# Patient Record
Sex: FEMALE | Race: WHITE | NOT HISPANIC OR LATINO | Employment: FULL TIME | URBAN - METROPOLITAN AREA
[De-identification: names, ages, dates, MRNs, and addresses within clinical notes are randomized per-mention and may not be internally consistent; named-entity substitution may affect disease eponyms.]

---

## 2020-01-28 ENCOUNTER — TELEPHONE (OUTPATIENT)
Dept: UROLOGY | Facility: CLINIC | Age: 45
End: 2020-01-28

## 2020-02-10 ENCOUNTER — OFFICE VISIT (OUTPATIENT)
Dept: UROLOGY | Facility: CLINIC | Age: 45
End: 2020-02-10
Payer: COMMERCIAL

## 2020-02-10 VITALS — DIASTOLIC BLOOD PRESSURE: 75 MMHG | SYSTOLIC BLOOD PRESSURE: 111 MMHG | WEIGHT: 123.69 LBS | HEART RATE: 74 BPM

## 2020-02-10 DIAGNOSIS — R31.0 GROSS HEMATURIA: ICD-10-CM

## 2020-02-10 DIAGNOSIS — R30.0 DYSURIA: Primary | ICD-10-CM

## 2020-02-10 PROCEDURE — 99999 PR PBB SHADOW E&M-EST. PATIENT-LVL II: ICD-10-PCS | Mod: PBBFAC,,, | Performed by: UROLOGY

## 2020-02-10 PROCEDURE — 99999 PR PBB SHADOW E&M-EST. PATIENT-LVL II: CPT | Mod: PBBFAC,,, | Performed by: UROLOGY

## 2020-02-10 PROCEDURE — 99204 PR OFFICE/OUTPT VISIT, NEW, LEVL IV, 45-59 MIN: ICD-10-PCS | Mod: S$GLB,,, | Performed by: UROLOGY

## 2020-02-10 PROCEDURE — 99204 OFFICE O/P NEW MOD 45 MIN: CPT | Mod: S$GLB,,, | Performed by: UROLOGY

## 2020-02-10 RX ORDER — CYANOCOBALAMIN (VITAMIN B-12) 1000 MCG
TABLET, EXTENDED RELEASE ORAL
COMMUNITY

## 2020-02-10 RX ORDER — AMOXICILLIN 500 MG
CAPSULE ORAL DAILY
COMMUNITY

## 2020-02-10 RX ORDER — CALCIUM CARBONATE 600 MG
1200 TABLET ORAL ONCE
COMMUNITY

## 2020-02-10 RX ORDER — ACETAMINOPHEN 160 MG/5ML
200 SUSPENSION, ORAL (FINAL DOSE FORM) ORAL DAILY
COMMUNITY

## 2020-02-10 RX ORDER — MAGNESIUM 250 MG
TABLET ORAL ONCE
COMMUNITY

## 2020-02-10 RX ORDER — IBUPROFEN 100 MG/5ML
2000 SUSPENSION, ORAL (FINAL DOSE FORM) ORAL DAILY
COMMUNITY

## 2020-02-10 RX ORDER — LEVOTHYROXINE SODIUM 100 UG/1
100 TABLET ORAL
COMMUNITY

## 2020-02-10 NOTE — LETTER
February 10, 2020      Parisa Pruett MD  [address]   [address]  [facility] [address]           [facility] - Urology 4th Floor  [address]  [facility] [address]  Phone: [phone]          Patient: Van Michael   MR Number: [id number]   YOB: 1975   Date of Visit: 2/10/2020       Dear :    Thank you for referring Van Michael to me for evaluation. Attached you will find relevant portions of my assessment and plan of care.    If you have questions, please do not hesitate to call me. I look forward to following Van Michael along with you.    Sincerely,    Jackson Burgos MD    Enclosure  CC:  No Recipients    If you would like to receive this communication electronically, please contact [email or web address] or [phone] to request more information on [employer] access.    For providers and/or their staff who would like to refer a patient to [facility], please contact us through our one-stop-shop provider referral line, [facility], at [phone].    If you feel you have received this communication in error or would no longer like to receive these types of communications, please e-mail [email or web address]

## 2020-02-10 NOTE — PROGRESS NOTES
[facility]      Hematuria and Dysuria New Note    2/10/2020    Referred by:  Parisa Pruett MD    HPI: Van Michael is a very pleasant 44 y.o. female who is a new patient to our department referred for evaluation of history of gross hematuria. She also reports occasional dysuria, though she is not experiencing currently.  She reports symptoms of irritative voiding including dysuria. She reports no frequency, urgency or incontinence.  She denies symptoms of obstructive voiding including decreased stream, hesitancy, intermittency, post void dribbling and sense of incomplete emptying. Bladder scan PVR was 0 mL.  Her history includes prior pelvic surgery () but excludes a history of urolithiasis, neurological symptoms/diagnoses, incontinence procedures and prolapse surgeries. She reports no urinary incontinence.     Recent urine culture was reviewed and was negative. Her urinalysis today is suggestive of microscopic hematuria.     She reports no risk factors including tobacco exposure, family history of urologic malignancy, or industrial exposures.      Prior Evaluations:   No previous upper urinary tract evaluation   No previous lower urinary tract evaluation    A review of 10+ systems was conducted with pertinent positive and negative findings documented in HPI with all other systems reviewed and negative.    Past medical, family, surgical and social history reviewed as documented in chart with pertinent positive medical, family, surgical and social history detailed in HPI.    Exam Findings:    Const: no acute distress, conversant and alert  Eyes: anicteric, extraocular muscles intact  ENMT: normocephalic, Nl oral membranes  Cardio: no cyanosis, nl cap refill  Pulm: no tachypnea; no resp distress  Abd: soft, no tenderness  Musc: no laceration, no tenderness  Neuro: alert; oriented x 3  Skin: warm, dry; no petichiae  Psych: no anxiety; normal speech       Assessment/Plan:    Gross Hematuria:  (new,  addt'l workup):  We discussed evaluation of her hematuria, including upper and lower urinary tract studies.     1. Scheduled CT Urogram for evaluation of upper urinary tracts.  2. Scheduled office cystourethroscopy with urine cytology and FISH at that time if indicated

## 2020-02-14 ENCOUNTER — HOSPITAL ENCOUNTER (OUTPATIENT)
Dept: RADIOLOGY | Facility: HOSPITAL | Age: 45
Discharge: HOME | End: 2020-02-14
Attending: UROLOGY
Payer: COMMERCIAL

## 2020-02-14 DIAGNOSIS — R31.0 GROSS HEMATURIA: ICD-10-CM

## 2020-02-14 PROCEDURE — 25500020 PHARM REV CODE 255: Performed by: UROLOGY

## 2020-02-14 PROCEDURE — 74178 CT ABD&PLV WO CNTR FLWD CNTR: CPT | Mod: TC

## 2020-02-14 PROCEDURE — 74178 CT UROGRAM ABD PELVIS W WO: ICD-10-PCS | Mod: 26,,, | Performed by: RADIOLOGY

## 2020-02-14 PROCEDURE — 74178 CT ABD&PLV WO CNTR FLWD CNTR: CPT | Mod: 26,,, | Performed by: RADIOLOGY

## 2020-02-14 RX ADMIN — IOHEXOL 125 ML: 350 INJECTION, SOLUTION INTRAVENOUS at 08:02

## 2020-02-20 ENCOUNTER — HOSPITAL ENCOUNTER (OUTPATIENT)
Dept: UROLOGY | Facility: HOSPITAL | Age: 45
Discharge: HOME | End: 2020-02-20
Attending: UROLOGY
Payer: COMMERCIAL

## 2020-02-20 ENCOUNTER — TELEPHONE (OUTPATIENT)
Dept: UROLOGY | Facility: CLINIC | Age: 45
End: 2020-02-20

## 2020-02-20 VITALS
RESPIRATION RATE: 17 BRPM | SYSTOLIC BLOOD PRESSURE: 145 MMHG | TEMPERATURE: 98 F | HEART RATE: 70 BPM | DIASTOLIC BLOOD PRESSURE: 67 MMHG | WEIGHT: 120.38 LBS | BODY MASS INDEX: 22.15 KG/M2 | HEIGHT: 62 IN

## 2020-02-20 DIAGNOSIS — R31.0 GROSS HEMATURIA: Primary | ICD-10-CM

## 2020-02-20 PROCEDURE — 52000 CYSTOURETHROSCOPY: CPT | Mod: ,,, | Performed by: UROLOGY

## 2020-02-20 PROCEDURE — 52000 CYSTOURETHROSCOPY: CPT

## 2020-02-20 PROCEDURE — 52000 PR CYSTOURETHROSCOPY: ICD-10-PCS | Mod: ,,, | Performed by: UROLOGY

## 2020-02-20 RX ORDER — LIDOCAINE HYDROCHLORIDE 20 MG/ML
JELLY TOPICAL
Status: COMPLETED | OUTPATIENT
Start: 2020-02-20 | End: 2020-02-20

## 2020-02-20 RX ADMIN — LIDOCAINE HYDROCHLORIDE 10 ML: 20 JELLY TOPICAL at 02:02

## 2020-02-20 NOTE — PROCEDURES
Office Cystourethroscopy Note    Date: 2/20/2020   Referring Provider: Jackson Burgos MD     Reason for Cystoscopy: Gross Hematuria    Upper Tract Evaluation:    CT Urogram: Normal upper urinary tract with exception of likely small renal cysts    Procedure Details: Informed consent was obtained and she was sterily prepped and 1% lidocaine jelly was injected per urethra. A flexible cystoscope was inserted into the bladder via the urethra. There was no evidence of stricture, stenosis, lesions, other obstruction or diverticulum. Significant findings included a normal unobstructed urethra only. Cystoscopic examination of the bladder revealed orthotopically positioned, normal bilateral ureteral orifices with clear yellow urine effluxing from each orifice. All mucosal surfaces were examined with no apparent stones, tumors, foreign bodies, erythema, trabeculation, diverticula or ulcers. The procedure was concluded without complications. The patient was not administered a post-procedure antibiotic.     Specimens: No Specimens    Findings: Normal bladder and urethra    Other Findings:  PVR - 0 mL    Pelvic Exam:  No Pelvic Exam Repeated Today     Assesment and Plan:   Gross Hematuria: No obvious concerning etiology identified on cystoscopy today or upper urinary tract imaging. If gross hematuria persists, I would consider cystoscopy at the time of gross hematuria episode to attempt to identify source of bleeding or if bleeding lateralizes to either ureter or possible bilateral ureteroscopy.

## 2020-02-20 NOTE — PATIENT INSTRUCTIONS
What to Expect After a Cystoscopy  For the next 24-48 hours, you may feel a mild burning when you urinate. This burning is normal and expected. Drink plenty of water to dilute the urine to help relieve the burning sensation. You may also see a small amount of blood in your urine after the procedure.    Unless you are already taking antibiotics, you may be given an antibiotic after the test to prevent infection.    Signs and Symptoms to Report  Call the [facility] at [phone] if you develop any of the following:  · Fever of 101 degrees or higher  · Chills or persistent bleeding  · Inability to urinate

## 2020-02-20 NOTE — TELEPHONE ENCOUNTER
----- Message from Dagmar Gillette RN sent at 2/20/2020  2:18 PM CST -----  rtc in 6 months for f/u

## 2020-02-21 ENCOUNTER — TELEPHONE (OUTPATIENT)
Dept: UROLOGY | Facility: CLINIC | Age: 45
End: 2020-02-21

## 2020-02-21 NOTE — TELEPHONE ENCOUNTER
----- Message from Dagmar Gillette RN sent at 2/20/2020  2:33 PM CST -----  Regarding: Records to be sent to PCP  Patient asked to have her records sent to her PCP, Dr. Karl Pruett in [place]

## 2020-10-19 ENCOUNTER — OFFICE VISIT (OUTPATIENT)
Dept: OBSTETRICS AND GYNECOLOGY | Facility: CLINIC | Age: 45
End: 2020-10-19
Payer: COMMERCIAL

## 2020-10-19 VITALS
BODY MASS INDEX: 22.82 KG/M2 | WEIGHT: 124 LBS | HEIGHT: 62 IN | DIASTOLIC BLOOD PRESSURE: 86 MMHG | SYSTOLIC BLOOD PRESSURE: 137 MMHG | HEART RATE: 73 BPM

## 2020-10-19 DIAGNOSIS — L72.0 MILIA: Primary | ICD-10-CM

## 2020-10-19 PROCEDURE — 99213 OFFICE O/P EST LOW 20 MIN: CPT | Mod: S$GLB,,, | Performed by: OBSTETRICS & GYNECOLOGY

## 2020-10-19 PROCEDURE — 3008F BODY MASS INDEX DOCD: CPT | Mod: CPTII,S$GLB,, | Performed by: OBSTETRICS & GYNECOLOGY

## 2020-10-19 PROCEDURE — 99999 PR PBB SHADOW E&M-EST. PATIENT-LVL III: ICD-10-PCS | Mod: PBBFAC,,, | Performed by: OBSTETRICS & GYNECOLOGY

## 2020-10-19 PROCEDURE — 3008F PR BODY MASS INDEX (BMI) DOCUMENTED: ICD-10-PCS | Mod: CPTII,S$GLB,, | Performed by: OBSTETRICS & GYNECOLOGY

## 2020-10-19 PROCEDURE — 99213 PR OFFICE/OUTPT VISIT, EST, LEVL III, 20-29 MIN: ICD-10-PCS | Mod: S$GLB,,, | Performed by: OBSTETRICS & GYNECOLOGY

## 2020-10-19 PROCEDURE — 99999 PR PBB SHADOW E&M-EST. PATIENT-LVL III: CPT | Mod: PBBFAC,,, | Performed by: OBSTETRICS & GYNECOLOGY

## 2020-10-19 RX ORDER — PANTOPRAZOLE SODIUM 40 MG/1
TABLET, DELAYED RELEASE ORAL
COMMUNITY
Start: 2020-10-01

## 2020-10-19 RX ORDER — UBROGEPANT 50 MG/1
TABLET ORAL
COMMUNITY
Start: 2020-07-20

## 2020-10-19 NOTE — PATIENT INSTRUCTIONS
Understanding Epidermoid Cyst    An epidermoid cyst is a small abnormal growth in the top layers of the skin. It is filled with keratin, the same proteins that make up your hair and nails. These cysts grow slowly. They can grow anywhere on the body. But they are most often found on the face, behind the ears, and on the chest or upper back.  How to say it  [id number]   What causes an epidermoid cyst?  An epidermoid cyst may occur because of an injury to the skin or from acne.  Symptoms of an epidermoid cyst  An epidermoid cyst is a subcutaneous bump. This means it is just below the skin. It may be yellow or skin-colored. It often has a small black yoselin in the middle of it, like a blackhead.  An epidermoid cyst rarely causes pain, unless it ruptures or becomes infected. It may ooze keratin, a white, cheesy, smelly material. If the cyst becomes inflamed or infected, it may be:  · Bad smelling  · Red  · Swollen  · Tender  Treatment for an epidermoid cyst  Many epidermoid cysts dont cause any problems. They dont necessarily need to be treated. They may go away on their own. But you may want to treat it if you dont like how it looks or it becomes inflamed.  Treatment options include:  · Steroid injection. A steroid may be injected into the cyst. This may help ease inflammation. It may also prevent infection.  · Surgical removal. The cyst can be cut out. It may also need to be drained first. There is a slight chance the cyst may come back.  · Antibiotics. In some cases, you may need to take an oral antibiotic to prevent infection and regrowth.  When to call your healthcare provider  Call your healthcare provider right away if you have any of these:  · Fever of 100.4°F (38°C) or higher, or as directed  · Redness, swelling, or fluid leaking from your incision that gets worse  · Pain that gets worse  · Symptoms that dont get better, or get worse  · New symptoms   Date Last Reviewed: 5/1/2016  © 1512-4279 [employer]. [address] [place] [address], [place] [address]. All rights reserved. This information is not intended as a substitute for professional medical care. Always follow your healthcare professional's instructions.

## 2020-10-19 NOTE — PROGRESS NOTES
Subjective:    Patient ID: Van Michael is a 44 y.o. y.o. female    Chief Complaint:   Chief Complaint   Patient presents with    Vaginal Pain     knot on labia       History of Present Illness:  Van presents today for evaluation of a lump on the labia.  Patient has been known to have several little tiny bumps on bilateral labia minora.  States she has a larger 1 toward the bottom of this area on the right that is more swollen and she noticed it last week.      Review of Systems   Constitutional: Negative for chills, fatigue and fever.   Respiratory: Negative for shortness of breath.    Cardiovascular: Negative for chest pain.   Gastrointestinal: Negative for abdominal pain, constipation, diarrhea and nausea.   Genitourinary: Negative for bladder incontinence, dysuria, hot flashes, pelvic pain and vaginal bleeding.   Neurological: Negative for headaches.   Psychiatric/Behavioral: Negative for depression.         Objective:    Vital Signs:  Vitals:    10/19/20 1341   BP: 137/86   Pulse: 73       Physical Exam:  General:  alert, no distress   Skin:  Skin color, texture, turgor normal. No rashes or lesions       Respiratory:  clear to auscultation bilaterally   Heart:  regular rate and rhythm, S1, S2 normal, no murmur, click, rub or gallop   Abdomen:  Soft, non-tender. Bowel sounds normal. No masses,  no organomegaly   Pelvis: External genitalia: normal general appearance, Small pinpoint areas on the bilateral labia minora consistent with milia/epidermoid cysts.  Each 1 approximately 1 mm  Vaginal: normal mucosa without prolapse or lesions         Discussed the findings and that these are not cancerous and not infectious.  Discussed supportive care in their management.        Assessment:      1. Milia          Plan:      [facility]    supportive care--aquaphor prn  rtc for annual      Earline Brower MD FACOG    10/19/2020  2:07 PM

## 2022-05-10 NOTE — TELEPHONE ENCOUNTER
----- Message from Joan Springer sent at 1/28/2020  8:23 AM CST -----  Regarding: External referral  Good Morning,    I received an external referral on the following patient requesting appointment with Dr. Burgos.   Dx: burning/pain when urinating and mild blood in UA.  Referral and records from Dr. Pruett have been saved into .    Can you please review and contact patient to schedule appointment?    Thank you,  [facility]  Jasmyne Gutierrez   Performed Resulted

## 2022-08-22 ENCOUNTER — LAB VISIT (OUTPATIENT)
Dept: LAB | Facility: HOSPITAL | Age: 47
End: 2022-08-22
Attending: INTERNAL MEDICINE
Payer: COMMERCIAL

## 2022-08-22 DIAGNOSIS — E83.51 HYPOCALCEMIA: Primary | ICD-10-CM

## 2022-08-22 LAB
ANION GAP SERPL CALC-SCNC: 3 MMOL/L (ref 8–16)
BUN SERPL-MCNC: 25 MG/DL (ref 6–20)
CALCIUM SERPL-MCNC: 7.3 MG/DL (ref 8.7–10.5)
CHLORIDE SERPL-SCNC: 107 MMOL/L (ref 95–110)
CO2 SERPL-SCNC: 29 MMOL/L (ref 23–29)
CREAT SERPL-MCNC: 0.7 MG/DL (ref 0.5–1.4)
EST. GFR  (NO RACE VARIABLE): >60 ML/MIN/1.73 M^2
GLUCOSE SERPL-MCNC: 95 MG/DL (ref 70–110)
POTASSIUM SERPL-SCNC: 4 MMOL/L (ref 3.5–5.1)
SODIUM SERPL-SCNC: 139 MMOL/L (ref 136–145)

## 2022-08-22 PROCEDURE — 36415 COLL VENOUS BLD VENIPUNCTURE: CPT | Performed by: INTERNAL MEDICINE

## 2022-08-22 PROCEDURE — 80048 BASIC METABOLIC PNL TOTAL CA: CPT | Performed by: INTERNAL MEDICINE

## 2022-09-23 ENCOUNTER — LAB VISIT (OUTPATIENT)
Dept: LAB | Facility: HOSPITAL | Age: 47
End: 2022-09-23
Attending: INTERNAL MEDICINE
Payer: COMMERCIAL

## 2022-09-23 DIAGNOSIS — E89.0 POSTSURGICAL HYPOTHYROIDISM: Primary | ICD-10-CM

## 2022-09-23 DIAGNOSIS — E83.51 HYPOCALCEMIA: ICD-10-CM

## 2022-09-23 LAB
ALBUMIN SERPL BCP-MCNC: 3.6 G/DL (ref 3.5–5.2)
ANION GAP SERPL CALC-SCNC: 4 MMOL/L (ref 8–16)
BUN SERPL-MCNC: 21 MG/DL (ref 6–20)
CALCIUM SERPL-MCNC: 8.2 MG/DL (ref 8.7–10.5)
CHLORIDE SERPL-SCNC: 105 MMOL/L (ref 95–110)
CO2 SERPL-SCNC: 29 MMOL/L (ref 23–29)
CREAT SERPL-MCNC: 0.7 MG/DL (ref 0.5–1.4)
EST. GFR  (NO RACE VARIABLE): >60 ML/MIN/1.73 M^2
GLUCOSE SERPL-MCNC: 105 MG/DL (ref 70–110)
PHOSPHATE SERPL-MCNC: 3.6 MG/DL (ref 2.7–4.5)
POTASSIUM SERPL-SCNC: 3.9 MMOL/L (ref 3.5–5.1)
SODIUM SERPL-SCNC: 138 MMOL/L (ref 136–145)

## 2022-09-23 PROCEDURE — 36415 COLL VENOUS BLD VENIPUNCTURE: CPT | Performed by: INTERNAL MEDICINE

## 2022-09-23 PROCEDURE — 80069 RENAL FUNCTION PANEL: CPT | Performed by: INTERNAL MEDICINE

## 2024-04-22 ENCOUNTER — LAB VISIT (OUTPATIENT)
Dept: LAB | Facility: HOSPITAL | Age: 49
End: 2024-04-22
Attending: INTERNAL MEDICINE
Payer: COMMERCIAL

## 2024-04-22 DIAGNOSIS — E89.0 POSTSURGICAL HYPOTHYROIDISM: ICD-10-CM

## 2024-04-22 DIAGNOSIS — E83.51 HYPOCALCEMIA: ICD-10-CM

## 2024-04-22 DIAGNOSIS — E89.2 POSTPROCEDURAL HYPOPARATHYROIDISM: Primary | ICD-10-CM

## 2024-04-22 LAB
25(OH)D3+25(OH)D2 SERPL-MCNC: 69 NG/ML (ref 30–96)
ALBUMIN SERPL BCP-MCNC: 4.3 G/DL (ref 3.5–5.2)
ALBUMIN SERPL BCP-MCNC: 4.3 G/DL (ref 3.5–5.2)
ALP SERPL-CCNC: 39 U/L (ref 55–135)
ALT SERPL W/O P-5'-P-CCNC: 33 U/L (ref 10–44)
ANION GAP SERPL CALC-SCNC: 7 MMOL/L (ref 3–11)
ANION GAP SERPL CALC-SCNC: 7 MMOL/L (ref 3–11)
AST SERPL-CCNC: 22 U/L (ref 10–40)
BILIRUB SERPL-MCNC: 0.4 MG/DL (ref 0.1–1)
BUN SERPL-MCNC: 24 MG/DL (ref 6–20)
BUN SERPL-MCNC: 24 MG/DL (ref 6–20)
CALCIUM SERPL-MCNC: 8.8 MG/DL (ref 8.7–10.5)
CALCIUM SERPL-MCNC: 8.8 MG/DL (ref 8.7–10.5)
CHLORIDE SERPL-SCNC: 105 MMOL/L (ref 95–110)
CHLORIDE SERPL-SCNC: 105 MMOL/L (ref 95–110)
CO2 SERPL-SCNC: 27 MMOL/L (ref 23–29)
CO2 SERPL-SCNC: 27 MMOL/L (ref 23–29)
CREAT SERPL-MCNC: 0.7 MG/DL (ref 0.5–1.4)
CREAT SERPL-MCNC: 0.7 MG/DL (ref 0.5–1.4)
EST. GFR  (NO RACE VARIABLE): >60 ML/MIN/1.73 M^2
EST. GFR  (NO RACE VARIABLE): >60 ML/MIN/1.73 M^2
GLUCOSE SERPL-MCNC: 96 MG/DL (ref 70–110)
GLUCOSE SERPL-MCNC: 96 MG/DL (ref 70–110)
PHOSPHATE SERPL-MCNC: 3.5 MG/DL (ref 2.7–4.5)
POTASSIUM SERPL-SCNC: 3.8 MMOL/L (ref 3.5–5.1)
POTASSIUM SERPL-SCNC: 3.8 MMOL/L (ref 3.5–5.1)
PROT SERPL-MCNC: 8.1 G/DL (ref 6–8.4)
PTH-INTACT SERPL-MCNC: 15.6 PG/ML (ref 9–77)
SODIUM SERPL-SCNC: 139 MMOL/L (ref 136–145)
SODIUM SERPL-SCNC: 139 MMOL/L (ref 136–145)
T4 FREE SERPL-MCNC: 1.35 NG/DL (ref 0.71–1.51)
TSH SERPL DL<=0.005 MIU/L-ACNC: 0.16 UIU/ML (ref 0.4–4)

## 2024-04-22 PROCEDURE — 36415 COLL VENOUS BLD VENIPUNCTURE: CPT | Performed by: INTERNAL MEDICINE

## 2024-04-22 PROCEDURE — 82306 VITAMIN D 25 HYDROXY: CPT | Performed by: INTERNAL MEDICINE

## 2024-04-22 PROCEDURE — 84439 ASSAY OF FREE THYROXINE: CPT | Performed by: INTERNAL MEDICINE

## 2024-04-22 PROCEDURE — 83970 ASSAY OF PARATHORMONE: CPT | Performed by: INTERNAL MEDICINE

## 2024-04-22 PROCEDURE — 80053 COMPREHEN METABOLIC PANEL: CPT | Performed by: INTERNAL MEDICINE

## 2024-04-22 PROCEDURE — 84100 ASSAY OF PHOSPHORUS: CPT | Performed by: INTERNAL MEDICINE

## 2024-04-22 PROCEDURE — 84443 ASSAY THYROID STIM HORMONE: CPT | Performed by: INTERNAL MEDICINE

## 2024-12-09 DIAGNOSIS — B18.2 CHRONIC HEPATITIS C WITHOUT HEPATIC COMA: Primary | ICD-10-CM

## 2024-12-19 ENCOUNTER — HOSPITAL ENCOUNTER (EMERGENCY)
Facility: HOSPITAL | Age: 49
Discharge: HOME | End: 2024-12-20
Attending: STUDENT IN AN ORGANIZED HEALTH CARE EDUCATION/TRAINING PROGRAM
Payer: COMMERCIAL

## 2024-12-19 DIAGNOSIS — R10.9 ABDOMINAL PAIN, UNSPECIFIED ABDOMINAL LOCATION: Primary | ICD-10-CM

## 2024-12-19 DIAGNOSIS — R19.7 DIARRHEA, UNSPECIFIED TYPE: ICD-10-CM

## 2024-12-19 LAB
ALBUMIN SERPL BCP-MCNC: 3.5 G/DL (ref 3.5–5.2)
ALP SERPL-CCNC: 48 U/L (ref 55–135)
ALT SERPL W/O P-5'-P-CCNC: 29 U/L (ref 10–44)
ANION GAP SERPL CALC-SCNC: 7 MMOL/L (ref 3–11)
AST SERPL-CCNC: 18 U/L (ref 10–40)
BACTERIA #/AREA URNS HPF: NEGATIVE /HPF
BASOPHILS # BLD AUTO: 0.04 K/UL (ref 0–0.2)
BASOPHILS NFR BLD: 0.4 % (ref 0–1.9)
BILIRUB SERPL-MCNC: 0.2 MG/DL (ref 0.1–1)
BILIRUB UR QL STRIP: NEGATIVE
BUN SERPL-MCNC: 28 MG/DL (ref 6–20)
CALCIUM SERPL-MCNC: 8.3 MG/DL (ref 8.7–10.5)
CHLORIDE SERPL-SCNC: 107 MMOL/L (ref 95–110)
CLARITY UR: CLEAR
CO2 SERPL-SCNC: 30 MMOL/L (ref 23–29)
COLOR UR: YELLOW
CREAT SERPL-MCNC: 0.9 MG/DL (ref 0.5–1.4)
DIFFERENTIAL METHOD BLD: NORMAL
EOSINOPHIL # BLD AUTO: 0.1 K/UL (ref 0–0.5)
EOSINOPHIL NFR BLD: 0.9 % (ref 0–8)
ERYTHROCYTE [DISTWIDTH] IN BLOOD BY AUTOMATED COUNT: 12.7 % (ref 11.5–14.5)
EST. GFR  (NO RACE VARIABLE): >60 ML/MIN/1.73 M^2
GLUCOSE SERPL-MCNC: 117 MG/DL (ref 70–110)
GLUCOSE UR QL STRIP: NEGATIVE
HCT VFR BLD AUTO: 41.8 % (ref 37–48.5)
HGB BLD-MCNC: 13.6 G/DL (ref 12–16)
HGB UR QL STRIP: ABNORMAL
HYALINE CASTS #/AREA URNS LPF: 0.3 /LPF
IMM GRANULOCYTES # BLD AUTO: 0.02 K/UL (ref 0–0.04)
IMM GRANULOCYTES NFR BLD AUTO: 0.2 % (ref 0–0.5)
KETONES UR QL STRIP: NEGATIVE
LEUKOCYTE ESTERASE UR QL STRIP: ABNORMAL
LIPASE SERPL-CCNC: 34 U/L (ref 13–75)
LYMPHOCYTES # BLD AUTO: 2.3 K/UL (ref 1–4.8)
LYMPHOCYTES NFR BLD: 23.7 % (ref 18–48)
MCH RBC QN AUTO: 29.8 PG (ref 27–31)
MCHC RBC AUTO-ENTMCNC: 32.5 G/DL (ref 32–36)
MCV RBC AUTO: 92 FL (ref 82–98)
MICROSCOPIC COMMENT: ABNORMAL
MONOCYTES # BLD AUTO: 0.7 K/UL (ref 0.3–1)
MONOCYTES NFR BLD: 7.1 % (ref 4–15)
NEUTROPHILS # BLD AUTO: 6.5 K/UL (ref 1.8–7.7)
NEUTROPHILS NFR BLD: 67.7 % (ref 38–73)
NITRITE UR QL STRIP: NEGATIVE
NRBC BLD-RTO: 0 /100 WBC
PH UR STRIP: 7 [PH] (ref 5–8)
PLATELET # BLD AUTO: 215 K/UL (ref 150–450)
PMV BLD AUTO: 10.4 FL (ref 9.2–12.9)
POTASSIUM SERPL-SCNC: 4.2 MMOL/L (ref 3.5–5.1)
PROT SERPL-MCNC: 7.1 G/DL (ref 6–8.4)
PROT UR QL STRIP: NEGATIVE
RBC # BLD AUTO: 4.57 M/UL (ref 4–5.4)
RBC #/AREA URNS HPF: 5 /HPF (ref 0–4)
SODIUM SERPL-SCNC: 144 MMOL/L (ref 136–145)
SP GR UR STRIP: 1.02 (ref 1–1.03)
SQUAMOUS #/AREA URNS HPF: 3 /HPF
URN SPEC COLLECT METH UR: ABNORMAL
UROBILINOGEN UR STRIP-ACNC: NEGATIVE EU/DL
WBC # BLD AUTO: 9.63 K/UL (ref 3.9–12.7)
WBC #/AREA URNS HPF: 7 /HPF (ref 0–5)

## 2024-12-19 PROCEDURE — 99285 EMERGENCY DEPT VISIT HI MDM: CPT | Mod: 25

## 2024-12-19 PROCEDURE — 81000 URINALYSIS NONAUTO W/SCOPE: CPT | Performed by: STUDENT IN AN ORGANIZED HEALTH CARE EDUCATION/TRAINING PROGRAM

## 2024-12-19 PROCEDURE — 63600175 PHARM REV CODE 636 W HCPCS: Performed by: STUDENT IN AN ORGANIZED HEALTH CARE EDUCATION/TRAINING PROGRAM

## 2024-12-19 PROCEDURE — 36415 COLL VENOUS BLD VENIPUNCTURE: CPT | Performed by: STUDENT IN AN ORGANIZED HEALTH CARE EDUCATION/TRAINING PROGRAM

## 2024-12-19 PROCEDURE — 96374 THER/PROPH/DIAG INJ IV PUSH: CPT

## 2024-12-19 PROCEDURE — 80053 COMPREHEN METABOLIC PANEL: CPT | Performed by: STUDENT IN AN ORGANIZED HEALTH CARE EDUCATION/TRAINING PROGRAM

## 2024-12-19 PROCEDURE — 83690 ASSAY OF LIPASE: CPT | Performed by: STUDENT IN AN ORGANIZED HEALTH CARE EDUCATION/TRAINING PROGRAM

## 2024-12-19 PROCEDURE — 85025 COMPLETE CBC W/AUTO DIFF WBC: CPT | Performed by: STUDENT IN AN ORGANIZED HEALTH CARE EDUCATION/TRAINING PROGRAM

## 2024-12-19 PROCEDURE — 25000003 PHARM REV CODE 250: Performed by: STUDENT IN AN ORGANIZED HEALTH CARE EDUCATION/TRAINING PROGRAM

## 2024-12-19 PROCEDURE — 96375 TX/PRO/DX INJ NEW DRUG ADDON: CPT

## 2024-12-19 RX ORDER — ONDANSETRON HYDROCHLORIDE 2 MG/ML
4 INJECTION, SOLUTION INTRAVENOUS
Status: COMPLETED | OUTPATIENT
Start: 2024-12-19 | End: 2024-12-19

## 2024-12-19 RX ORDER — FAMOTIDINE 10 MG/ML
20 INJECTION INTRAVENOUS
Status: COMPLETED | OUTPATIENT
Start: 2024-12-19 | End: 2024-12-19

## 2024-12-19 RX ORDER — MORPHINE SULFATE 4 MG/ML
4 INJECTION, SOLUTION INTRAMUSCULAR; INTRAVENOUS
Status: COMPLETED | OUTPATIENT
Start: 2024-12-19 | End: 2024-12-19

## 2024-12-19 RX ADMIN — FAMOTIDINE 20 MG: 10 INJECTION, SOLUTION INTRAVENOUS at 11:12

## 2024-12-19 RX ADMIN — ONDANSETRON 4 MG: 2 INJECTION INTRAMUSCULAR; INTRAVENOUS at 11:12

## 2024-12-19 RX ADMIN — MORPHINE SULFATE 4 MG: 4 INJECTION, SOLUTION INTRAMUSCULAR; INTRAVENOUS at 11:12

## 2024-12-20 VITALS
DIASTOLIC BLOOD PRESSURE: 98 MMHG | OXYGEN SATURATION: 98 % | HEIGHT: 60 IN | WEIGHT: 132 LBS | TEMPERATURE: 98 F | RESPIRATION RATE: 16 BRPM | HEART RATE: 63 BPM | BODY MASS INDEX: 25.91 KG/M2 | SYSTOLIC BLOOD PRESSURE: 160 MMHG

## 2024-12-20 PROCEDURE — 25500020 PHARM REV CODE 255: Performed by: STUDENT IN AN ORGANIZED HEALTH CARE EDUCATION/TRAINING PROGRAM

## 2024-12-20 RX ORDER — DICYCLOMINE HYDROCHLORIDE 20 MG/1
20 TABLET ORAL 2 TIMES DAILY
Qty: 20 TABLET | Refills: 0 | Status: SHIPPED | OUTPATIENT
Start: 2024-12-20 | End: 2024-12-20

## 2024-12-20 RX ORDER — ONDANSETRON 4 MG/1
4 TABLET, ORALLY DISINTEGRATING ORAL EVERY 6 HOURS PRN
Qty: 20 TABLET | Refills: 0 | Status: SHIPPED | OUTPATIENT
Start: 2024-12-20 | End: 2024-12-25

## 2024-12-20 RX ORDER — DICYCLOMINE HYDROCHLORIDE 20 MG/1
20 TABLET ORAL 2 TIMES DAILY
Qty: 20 TABLET | Refills: 0 | Status: SHIPPED | OUTPATIENT
Start: 2024-12-20 | End: 2025-01-19

## 2024-12-20 RX ORDER — IBUPROFEN 600 MG/1
600 TABLET ORAL EVERY 6 HOURS PRN
Qty: 20 TABLET | Refills: 0 | Status: SHIPPED | OUTPATIENT
Start: 2024-12-20

## 2024-12-20 RX ADMIN — IOHEXOL 100 ML: 350 INJECTION, SOLUTION INTRAVENOUS at 12:12

## 2024-12-20 NOTE — ED PROVIDER NOTES
History  Chief Complaint   Patient presents with    Abdominal Pain     Pt reports generalized abdominal pain and diarrhea that started this afternoon, denies n/v; pt also also reports mild headache.      49-year-old female with history of thyroid disease presents for evaluation of abdominal pain and diarrhea that started this afternoon.  Pain noted to be in the upper mid abdomen.  Rated a 10/10 described as cramping.  No urinary symptoms reported.  No nausea or vomiting reported.  No back pain reported.  No history of similar symptoms in the past.          Past Medical History:   Diagnosis Date    Thyroid disease     Urinary tract infection        Past Surgical History:   Procedure Laterality Date     SECTION      HYSTERECTOMY         Family History   Problem Relation Name Age of Onset    Hypertension Father      Diabetes Father         Social History     Tobacco Use    Smoking status: Never    Smokeless tobacco: Never       ROS  Review of Systems   Gastrointestinal:  Positive for abdominal pain and diarrhea.       Physical Exam  BP (!) 160/98   Pulse 63   Temp 97.9 °F (36.6 °C) (Oral)   Resp 16   Ht 5' (1.524 m)   Wt 59.9 kg (132 lb)   SpO2 98%   Breastfeeding No   BMI 25.78 kg/m²   Physical Exam    Constitutional: She appears well-developed and well-nourished. She is cooperative.   HENT:   Head: Normocephalic and atraumatic.   Eyes: Conjunctivae, EOM and lids are normal. Pupils are equal, round, and reactive to light.   Neck: Phonation normal.   Normal range of motion.  Cardiovascular:  Normal rate, regular rhythm and intact distal pulses.           Pulmonary/Chest: Breath sounds normal. No respiratory distress.   Abdominal: Abdomen is soft. There is abdominal tenderness.   Musculoskeletal:      Cervical back: Normal range of motion.     Neurological: She is alert and oriented to person, place, and time.   Skin: Skin is warm and dry.   Psychiatric: She has a normal mood and affect. Her speech is  normal and behavior is normal.               Labs Reviewed   COMPREHENSIVE METABOLIC PANEL - Abnormal       Result Value    Sodium 144      Potassium 4.2      Chloride 107      CO2 30 (*)     Glucose 117 (*)     BUN 28 (*)     Creatinine 0.9      Calcium 8.3 (*)     Total Protein 7.1      Albumin 3.5      Total Bilirubin 0.2      Alkaline Phosphatase 48 (*)     AST 18      ALT 29      eGFR >60.0      Anion Gap 7     URINALYSIS, REFLEX TO URINE CULTURE - Abnormal    Specimen UA Urine, Clean Catch      Color, UA Yellow      Appearance, UA Clear      pH, UA 7.0      Specific Gravity, UA 1.020      Protein, UA Negative      Glucose, UA Negative      Ketones, UA Negative      Bilirubin (UA) Negative      Occult Blood UA Trace (*)     Nitrite, UA Negative      Urobilinogen, UA Negative      Leukocytes, UA Trace (*)     Narrative:     Preferred Collection Type->Urine, Clean Catch  Specimen Source->Urine   URINALYSIS MICROSCOPIC - Abnormal    RBC, UA 5 (*)     WBC, UA 7 (*)     Bacteria Negative      Squam Epithel, UA 3      Hyaline Casts, UA 0.3 (*)     Microscopic Comment SEE COMMENT      Narrative:     Preferred Collection Type->Urine, Clean Catch  Specimen Source->Urine   CBC W/ AUTO DIFFERENTIAL    WBC 9.63      RBC 4.57      Hemoglobin 13.6      Hematocrit 41.8      MCV 92      MCH 29.8      MCHC 32.5      RDW 12.7      Platelets 215      MPV 10.4      Immature Granulocytes 0.2      Gran # (ANC) 6.5      Immature Grans (Abs) 0.02      Lymph # 2.3      Mono # 0.7      Eos # 0.1      Baso # 0.04      nRBC 0      Gran % 67.7      Lymph % 23.7      Mono % 7.1      Eosinophil % 0.9      Basophil % 0.4      Differential Method Automated     LIPASE    Lipase 34             Imaging Results              CT Abdomen Pelvis With IV Contrast NO Oral Contrast (In process)  Result time 12/20/24 00:36:50                                Procedures             Medical Decision Making  Differentials include gastritis, pancreatitis,  cholecystitis, colitis or alternate pathology.  Will obtain labs and imaging.  See ED course for updates    Amount and/or Complexity of Data Reviewed  Labs: ordered. Decision-making details documented in ED Course.    Risk  Prescription drug management.               ED Course as of 12/20/24 0243   Thu Dec 19, 2024   2351 Leukocyte Esterase, UA(!): Trace [NA]   2351 NITRITE UA: Negative [NA]   2351 WBC: 9.63 [NA]   2351 Hemoglobin: 13.6 [NA]   2351 Hematocrit: 41.8 [NA]   2351 Platelet Count: 215 [NA]   Fri Dec 20, 2024   0004 Lipase: 34 [NA]   0004 BUN(!): 28 [NA]   0004 Creatinine: 0.9 [NA]   0100 Labs reviewed unremarkable.  Patient with symptomatic improvement at this time.  Pending CT results. [NA]   0104 CT Abdomen Pelvis With IV Contrast NO Oral Contrast  No acute intra-abdominal pathology.  Suspect symptoms likely related to viral enteritis.  Will discharged advised continue symptomatic treatment in the outpatient setting.  Return precautions were given. [NA]      ED Course User Index  [NA] Pantera Gonzalez MD       DISCHARGE NOTE:       Van Michael's  results have been reviewed with her.  She has been counseled regarding her diagnosis, treatment, and plan.  She verbally conveys understanding and agreement of the signs, symptoms, diagnosis, treatment and prognosis and additionally agrees to follow up as discussed.  She also agrees with the care-plan and conveys that all of her questions have been answered.  I have also provided discharge instructions for her that include: educational information regarding their diagnosis and treatment, and list of reasons why they would want to return to the ED prior to their follow-up appointment, should her condition change. She has been provided with education for proper emergency department utilization.      CLINICAL IMPRESSION:         1. Abdominal pain, unspecified abdominal location    2. Diarrhea, unspecified type              PLAN:   1. Discharge  2.    Discharge Medication List as of 12/20/2024  1:06 AM        START taking these medications    Details   ibuprofen (ADVIL,MOTRIN) 600 MG tablet Take 1 tablet (600 mg total) by mouth every 6 (six) hours as needed for Pain., Starting Fri 12/20/2024, Normal      ondansetron (ZOFRAN-ODT) 4 MG TbDL Take 1 tablet (4 mg total) by mouth every 6 (six) hours as needed., Starting Fri 12/20/2024, Until Wed 12/25/2024 at 2359, Normal           3. Parisa Pruett MD  [address] [address] [phone]    Schedule an appointment as soon as possible for a visit in 3 days      [facility] Emergency Department  [address] [place] [address] [phone]    As needed, If symptoms worsen          Pantera Gonzalez MD  12/20/24 7859

## 2024-12-20 NOTE — DISCHARGE INSTRUCTIONS
Take medication as prescribed for symptomatic relief in the outpatient setting.  Follow up with PMD for re-evaluation in the outpatient setting.  Return if symptoms acutely worsened.

## 2024-12-23 DIAGNOSIS — Z12.11 SPECIAL SCREENING FOR MALIGNANT NEOPLASMS, COLON: Primary | ICD-10-CM

## 2024-12-23 DIAGNOSIS — R14.0 ABDOMINAL DISTENSION: ICD-10-CM

## 2024-12-23 DIAGNOSIS — R13.10 DYSPHAGIA: ICD-10-CM

## 2024-12-23 DIAGNOSIS — R13.10 DYSPHAGIA, UNSPECIFIED TYPE: ICD-10-CM

## 2024-12-23 RX ORDER — SODIUM CHLORIDE 0.9 % (FLUSH) 0.9 %
10 SYRINGE (ML) INJECTION
OUTPATIENT
Start: 2024-12-23

## 2024-12-23 RX ORDER — SODIUM CHLORIDE 9 MG/ML
INJECTION, SOLUTION INTRAVENOUS CONTINUOUS
OUTPATIENT
Start: 2024-12-23

## 2025-01-03 ENCOUNTER — HOSPITAL ENCOUNTER (OUTPATIENT)
Dept: PULMONOLOGY | Facility: HOSPITAL | Age: 50
Discharge: HOME | End: 2025-01-03
Attending: SURGERY
Payer: COMMERCIAL

## 2025-01-03 ENCOUNTER — HOSPITAL ENCOUNTER (OUTPATIENT)
Dept: PREADMISSION TESTING | Facility: HOSPITAL | Age: 50
Discharge: HOME | End: 2025-01-03
Attending: SURGERY
Payer: COMMERCIAL

## 2025-01-03 VITALS — WEIGHT: 130 LBS | HEIGHT: 60 IN | BODY MASS INDEX: 25.52 KG/M2

## 2025-01-03 DIAGNOSIS — R14.0 ABDOMINAL DISTENSION: ICD-10-CM

## 2025-01-03 DIAGNOSIS — Z12.11 SPECIAL SCREENING FOR MALIGNANT NEOPLASMS, COLON: ICD-10-CM

## 2025-01-03 DIAGNOSIS — R13.10 DYSPHAGIA, UNSPECIFIED TYPE: ICD-10-CM

## 2025-01-03 LAB
OHS QRS DURATION: 68 MS
OHS QTC CALCULATION: 432 MS

## 2025-01-03 PROCEDURE — 93010 ELECTROCARDIOGRAM REPORT: CPT | Mod: ,,, | Performed by: STUDENT IN AN ORGANIZED HEALTH CARE EDUCATION/TRAINING PROGRAM

## 2025-01-03 PROCEDURE — 93005 ELECTROCARDIOGRAM TRACING: CPT

## 2025-01-03 NOTE — DISCHARGE INSTRUCTIONS
BEFORE THE PROCEDURE:    REPORT ANY CHANGE IN YOUR PHYSICAL CONDITION TO YOUR DOCTOR IMMEDIATELY.  SELF ISOLATE AND CHECK TEMPERATURE DAILY, IF TEMP OVER 100, CALL PHYSICIAN IMMEDIATELY.    TRY TO REFRAIN FROM SMOKING AND ALCOHOL 72 HOURS BEFORE YOUR PROCEDURE.     THE DAY BEFORE YOUR PROCEDURE YOU WILL BE ON A  LIQUID DIET FROM WAKING IN THE MORNING UNTIL MIDNIGHT. YOU MAY HAVE WATER ONLY 4 HOURS PRIOR TO ARRIVAL.     REFER TO YOUR PHYSICIANS INSTRUCTIONS ON LIQUID DIET AND COLON PREP.    SOMEONE WILL CALL YOU THE DAY BEFORE YOUR PROCEDURE WITH A CHECK-IN TIME FOR YOUR PROCEDURE.    CHECK IN AT FIRST FLOOR REGISTRATION DESK.     DAY OF YOUR PROCEDURE:    NO MAKE UP, NAIL POLISH OR JEWELRY.  TAKE BLOOD PRESSURE MEDICATIONS THE MORNING OF YOUR PROCEDURE, WITH SMALL SIPS WATER, AS DIRECTED BY YOUR PHYSICIAN.   DO NOT TAKE ANY DIABETIC MEDICATIONS UNLESS DIRECTED TO DO SO BY YOUR PHYSICIAN.   CONTACT LENSES AND DENTURES MUST BE REMOVED.  A RESPONSIBLE ADULT MUST ACCOMPANY YOU HOME UPON DISCHARGE.   ONLY 1 VISITOR ALLOWED PER ROOM.     YOUR THOUGHTS AND OPINIONS HELP US TO BETTER SERVE YOU.     PLEASE PARTICIPATE IN SURVEYS ABOUT YOUR CARE.    THANK YOU FOR CHOOSING [facility].

## 2025-01-06 ENCOUNTER — ANESTHESIA EVENT (OUTPATIENT)
Dept: ENDOSCOPY | Facility: HOSPITAL | Age: 50
End: 2025-01-06
Payer: COMMERCIAL

## 2025-01-06 NOTE — ANESTHESIA PREPROCEDURE EVALUATION
01/06/2025  Van Michael is a 49 y.o., female.      Pre-op Assessment    I have reviewed the Patient Summary Reports.    I have reviewed the NPO Status.   I have reviewed the Medications.     Review of Systems  Anesthesia Hx:  No problems with previous Anesthesia             Denies Family Hx of Anesthesia complications.    Denies Personal Hx of Anesthesia complications.                    Social:  Non-Smoker       Cardiovascular:  Cardiovascular Normal                  ECG has been reviewed.                            Pulmonary:  Pulmonary Normal                       Renal/:  Renal/ Normal                 Hepatic/GI:  Hepatic/GI Normal                    Neurological:  Neurology Normal                                      Endocrine:   Hypothyroidism            Lab Results   Component Value Date    WBC 6.05 01/03/2025    HGB 13.9 01/03/2025    HCT 42.5 01/03/2025    MCV 92 01/03/2025     01/03/2025      CMP  Sodium   Date Value Ref Range Status   01/03/2025 143 136 - 145 mmol/L Final     Potassium   Date Value Ref Range Status   01/03/2025 3.8 3.5 - 5.1 mmol/L Final     Chloride   Date Value Ref Range Status   01/03/2025 105 95 - 110 mmol/L Final     CO2   Date Value Ref Range Status   01/03/2025 30 (H) 23 - 29 mmol/L Final     Glucose   Date Value Ref Range Status   01/03/2025 101 70 - 110 mg/dL Final     BUN   Date Value Ref Range Status   01/03/2025 23 (H) 6 - 20 mg/dL Final     Creatinine   Date Value Ref Range Status   01/03/2025 0.9 0.5 - 1.4 mg/dL Final     Calcium   Date Value Ref Range Status   01/03/2025 9.6 8.7 - 10.5 mg/dL Final     Total Protein   Date Value Ref Range Status   01/03/2025 7.6 6.0 - 8.4 g/dL Final     Albumin   Date Value Ref Range Status   01/03/2025 3.9 3.5 - 5.2 g/dL Final     Total Bilirubin   Date Value Ref Range Status   01/03/2025 0.3 0.1 - 1.0 mg/dL Final      Comment:     For infants and newborns, interpretation of results should be based  on gestational age, weight and in agreement with clinical  observations.    Premature Infant recommended reference ranges:  Up to 24 hours.............<8.0 mg/dL  Up to 48 hours............<12.0 mg/dL  3-5 days..................<15.0 mg/dL  6-29 days.................<15.0 mg/dL    For patients on Eltrombopag therapy, use of Dimension [place] TBIL is   not   recommended.       Alkaline Phosphatase   Date Value Ref Range Status   01/03/2025 46 (L) 55 - 135 U/L Final     AST   Date Value Ref Range Status   01/03/2025 18 10 - 40 U/L Final     ALT   Date Value Ref Range Status   01/03/2025 36 10 - 44 U/L Final     Anion Gap   Date Value Ref Range Status   01/03/2025 8 3 - 11 mmol/L Final     eGFR   Date Value Ref Range Status   01/03/2025 >60.0 >60 mL/min/1.73 m^2 Final        Physical Exam  General: Well nourished    Airway:  Mallampati: II / I  Mouth Opening: Normal  TM Distance: Normal  Tongue: Normal  Neck ROM: Normal ROM    Dental:  Intact    Chest/Lungs:  Clear to auscultation    Heart:  Rate: Normal  Rhythm: Regular Rhythm  Sounds: Normal        Anesthesia Plan  Type of Anesthesia, risks & benefits discussed:    Anesthesia Type: MAC  Intra-op Monitoring Plan: Standard ASA Monitors  Post Op Pain Control Plan: multimodal analgesia  Induction:  IV  Airway Plan: Direct  Informed Consent: Informed consent signed with the Patient and all parties understand the risks and agree with anesthesia plan.  All questions answered.   ASA Score: 1  Day of Surgery Review of History & Physical: I have interviewed and examined the patient. I have reviewed the patient's H&P dated: There are no significant changes.     Ready For Surgery From Anesthesia Perspective.     .

## 2025-01-07 ENCOUNTER — HOSPITAL ENCOUNTER (OUTPATIENT)
Facility: HOSPITAL | Age: 50
Discharge: HOME | End: 2025-01-07
Attending: SURGERY | Admitting: SURGERY
Payer: COMMERCIAL

## 2025-01-07 ENCOUNTER — ANESTHESIA (OUTPATIENT)
Dept: ENDOSCOPY | Facility: HOSPITAL | Age: 50
End: 2025-01-07
Payer: COMMERCIAL

## 2025-01-07 VITALS
RESPIRATION RATE: 20 BRPM | TEMPERATURE: 98 F | HEART RATE: 66 BPM | OXYGEN SATURATION: 98 % | DIASTOLIC BLOOD PRESSURE: 77 MMHG | SYSTOLIC BLOOD PRESSURE: 120 MMHG

## 2025-01-07 DIAGNOSIS — R13.10 DYSPHAGIA: ICD-10-CM

## 2025-01-07 DIAGNOSIS — K29.70 GASTRITIS WITHOUT BLEEDING, UNSPECIFIED CHRONICITY, UNSPECIFIED GASTRITIS TYPE: ICD-10-CM

## 2025-01-07 DIAGNOSIS — Z12.11 SPECIAL SCREENING FOR MALIGNANT NEOPLASMS, COLON: Primary | ICD-10-CM

## 2025-01-07 DIAGNOSIS — R14.0 ABDOMINAL DISTENSION: ICD-10-CM

## 2025-01-07 PROCEDURE — 27201423 OPTIME MED/SURG SUP & DEVICES STERILE SUPPLY: Performed by: SURGERY

## 2025-01-07 PROCEDURE — G0121 COLON CA SCRN NOT HI RSK IND: HCPCS | Performed by: SURGERY

## 2025-01-07 PROCEDURE — 37000008 HC ANESTHESIA 1ST 15 MINUTES: Performed by: SURGERY

## 2025-01-07 PROCEDURE — 43239 EGD BIOPSY SINGLE/MULTIPLE: CPT | Performed by: SURGERY

## 2025-01-07 PROCEDURE — 63600175 PHARM REV CODE 636 W HCPCS: Performed by: NURSE ANESTHETIST, CERTIFIED REGISTERED

## 2025-01-07 PROCEDURE — 25000003 PHARM REV CODE 250: Performed by: SURGERY

## 2025-01-07 PROCEDURE — 37000009 HC ANESTHESIA EA ADD 15 MINS: Performed by: SURGERY

## 2025-01-07 RX ORDER — SODIUM CHLORIDE 9 MG/ML
INJECTION, SOLUTION INTRAVENOUS CONTINUOUS
Status: DISCONTINUED | OUTPATIENT
Start: 2025-01-07 | End: 2025-01-07 | Stop reason: HOSPADM

## 2025-01-07 RX ORDER — TOPICAL ANESTHETIC 200 MG/ML
SPRAY DENTAL; PERIODONTAL
Status: DISCONTINUED | OUTPATIENT
Start: 2025-01-07 | End: 2025-01-07 | Stop reason: HOSPADM

## 2025-01-07 RX ORDER — SUCRALFATE 1 G/1
1 TABLET ORAL 4 TIMES DAILY
Qty: 120 TABLET | Refills: 0 | Status: SHIPPED | OUTPATIENT
Start: 2025-01-07

## 2025-01-07 RX ORDER — SODIUM CHLORIDE 0.9 % (FLUSH) 0.9 %
10 SYRINGE (ML) INJECTION
Status: DISCONTINUED | OUTPATIENT
Start: 2025-01-07 | End: 2025-01-07 | Stop reason: HOSPADM

## 2025-01-07 RX ORDER — PROPOFOL 10 MG/ML
INJECTION, EMULSION INTRAVENOUS
Status: DISCONTINUED | OUTPATIENT
Start: 2025-01-07 | End: 2025-01-07

## 2025-01-07 RX ADMIN — PROPOFOL 30 MG: 10 INJECTION, EMULSION INTRAVENOUS at 09:01

## 2025-01-07 RX ADMIN — PROPOFOL 60 MG: 10 INJECTION, EMULSION INTRAVENOUS at 09:01

## 2025-01-07 RX ADMIN — PROPOFOL 40 MG: 10 INJECTION, EMULSION INTRAVENOUS at 09:01

## 2025-01-07 NOTE — OP NOTE
[place] - Endoscopy  EGD Procedure  Operative Note    SUMMARY     Date of Procedure: 1/7/2025     Procedure: Procedure(s) (LRB):  EGD, WITH CLOSED BIOPSY (N/A)  COLONOSCOPY, SCREENING, LOW RISK PATIENT (N/A)    Surgeons and Role:     * Roxanne Herring MD - Primary    Assisting Surgeon: None    Patient location: GI    Pre-Operative Diagnosis: Special screening for malignant neoplasms, colon [Z12.11]  Abdominal distension [R14.0]  Dysphagia, unspecified type [R13.10]    Post-Operative Diagnosis: Post-Op Diagnosis Codes:     * Special screening for malignant neoplasms, colon [Z12.11]     * Abdominal distension [R14.0]     * Dysphagia, unspecified type [R13.10]  Gastritis     Indications:  Screening age for colon cancer and a history of reflux with dysphagia need of endoscopic evaluation          Procedure:                  The patient was brought in to the endoscopy suite where the risks, benefits, and alternatives of the procedure were described.  The patient was given the opportunity to ask questions and then signed informed consent. Patient was positioned in the left lateral decubitus position, continuous monitoring was initiated, and supplemental oxygen was provided via nasal cannula.  Bite block was placed.  Adequate sedation was achieved with the above mentioned medications and then titrated during the entire procedure.  Under direct visualization the gastroscope was introduced through the oropharynx in to the esophagus.  The scope was then advanced in to the stomach and second portion of the duodenum.  Scope was then withdrawn and the mucosa was carefully examined.  The entire gastric mucosa was examined, including the fundus with retroflexion.  Air was evacuated from the stomach and the scope was withdrawn in to the esophagus.  The entire esophageal mucosa was examined.  The patient tolerated the procedure well and was able to be transferred to the recovery area in stable condition.    Findings:                  Esophagus:  GE junction noted at 35 cm from the incisors.  There was slight irregularity of the Z-line but insignificant.  No hiatal hernia appreciated.                      Stomach:  Irritation noted within the body in the antrum of the stomach.  Biopsies taken with the cold forceps for histopathology.  Scope was then retroflexed no hiatal hernia appreciated.                    Duodenum:  No mucosal abnormalities    Procedure:                  The patient was brought in to the endoscopy suite where the risks, benefits, and alternatives of the procedure were described.  The patient was given the opportunity to ask questions and then signed informed consent.  Patient was positioned in the left lateral decubitus position, continuous monitoring was initiated, and supplemental oxygen was provided via nasal cannula.  Adequate sedation was achieved with the above mentioned medications and then titrated during the entire procedure.  Digital rectal exam was performed.  Under direct visualization the colonoscope was introduced through the anus in to the rectum.  The scope was then advanced to the cecum, which was identified by the ileocecal valve.  Scope was then withdrawn and the mucosa was carefully examined in a circular fashion.  The entire colonic mucosa was examined.  Air was evacuated from the colon and the procedure was terminated.  The patient tolerated the procedure well and was able to be transferred to the recovery area in stable condition.    Findings:                 Digital rectal examination:  No palpable abnormalities.  No significant hemorrhoidal disease                     Rectum:  No mucosal abnormality                    Sigmoid:  No mucosal abnormalities        Descending:  No mucosal abnormality         Transverse:  No mucosal abnormality         Ascending:  No mucosal abnormality        Cecum:  Ileocecal valve appreciated.  Appendiceal orifice difficult to appreciate due to the curvature behind  the valve.  No mucosal abnormalities noted        Terminal Ileum:  Not intubate     Specimens:   Specimen (24h ago, onward)       Start     Ordered    01/07/25 0916  Specimen to Pathology, Surgery General Surgery  Once        Comments: Pre-op Diagnosis: Special screening for malignant neoplasms, colon [Z12.11]Abdominal distension [R14.0]Dysphagia, unspecified type [R13.10]Procedure(s):EGD, WITH CLOSED BIOPSYCOLONOSCOPY, SCREENING, LOW RISK PATIENT Number of specimens: 1Name of specimens: biopsy of antrum @0908,     References:    Click here for ordering Quick Tip   Question Answer Comment   Procedure Type: General Surgery    Release to patient Immediate        01/07/25 0928                      Estimated Blood Loss (EBL): * No values recorded between 1/7/2025  8:52 AM and 1/7/2025  9:32 AM *     Complications: No     Diagnostic Impression:  Gastritis, normal colon     Recommendations: Discharge patient to home. Patient has a contact number available for emergencies. The signs and symptoms of potential delayed complications were discussed with the patient. Return to normal activities tomorrow. Written discharge instructions were provided to the patient. Resume previous diet. Continue present medications. Await pathology results. Repeat procedure in 10 year colonoscopy for screening.    Disposition:  Recovery unit stable     Attestation: I performed the procedure.        Follow Up:             No future appointments.        Roxanne Herring MD  1/7/2025

## 2025-01-07 NOTE — DISCHARGE INSTRUCTIONS
FOLLOW UP WITH DR LOYD AS SCHEDULED.  REST TODAY --- RESUME ACTIVITY AS TOLERATED TOMORROW  RESUME CURRENT DIET   RESUME HOME MEDICATIONS  NO DRIVING OR DRINKING ALCOHOL FOR 24 HOURS.    CALL DR LOYD'S OFFICE FOR ANY QUESTIONS OR CONCERNS.  REPORT TO THE ER IF URGENT.    THANK YOU FOR CHOOSING [facility]!

## 2025-01-07 NOTE — TRANSFER OF CARE
Anesthesia Transfer of Care Note    Patient: Van Michael    Procedure(s) Performed: Procedure(s) (LRB):  EGD, WITH CLOSED BIOPSY (N/A)  COLONOSCOPY, SCREENING, LOW RISK PATIENT (N/A)    Patient location: OPS    Anesthesia Type: MAC    Transport from OR: Transported from OR on room air with adequate spontaneous ventilation    Post pain: adequate analgesia    Post assessment: no apparent anesthetic complications    Post vital signs: stable    Level of consciousness: awake, alert and oriented    Nausea/Vomiting: no nausea/vomiting    Complications: none    Transfer of care protocol was followed      Last vitals:     /72  P 70  R 16  O2 Sat 100%

## 2025-01-07 NOTE — DISCHARGE SUMMARY
Discharge Summary  General Surgery      Admit Date: 1/7/2025    Discharge Date :1/7/2025    Attending Physician: Roxanne Herring     Discharge Physician: Roxanne Herring    Discharged Condition: good    Discharge Diagnosis: Special screening for malignant neoplasms, colon [Z12.11]  Abdominal distension [R14.0]  Dysphagia, unspecified type [R13.10]  Gastritis    Treatments/Procedures: Procedure(s) (LRB):  EGD, WITH CLOSED BIOPSY (N/A)  COLONOSCOPY, SCREENING, LOW RISK PATIENT (N/A)    Hospital Course: Uneventful; Discharged home from Recovery    Significant Diagnostic Studies: none    Disposition: Home or Self Care    Diet: Regular    Follow up: Office 10-14 days    Activity: No restrictions    Patient Instructions:   Current Discharge Medication List        START taking these medications    Details   sucralfate (CARAFATE) 1 gram tablet Take 1 tablet (1 g total) by mouth 4 (four) times daily.  Qty: 120 tablet, Refills: 0           CONTINUE these medications which have NOT CHANGED    Details   ascorbic acid, vitamin C, (VITAMIN C) 1000 MG tablet Take 2,000 mg by mouth once daily.      calcium carbonate (OS-WILLARD) 600 mg calcium (1,500 mg) Tab Take 1,200 mg by mouth once.      cholecalciferol, vitamin D3, (D3-2000 ORAL) Take by mouth.      coenzyme Q10 200 mg capsule Take 200 mg by mouth once daily.      ibuprofen (ADVIL,MOTRIN) 600 MG tablet Take 1 tablet (600 mg total) by mouth every 6 (six) hours as needed for Pain.  Qty: 20 tablet, Refills: 0      levothyroxine (SYNTHROID) 100 MCG tablet Take 100 mcg by mouth before breakfast.      magnesium 250 mg Tab Take by mouth once.      multivitamin with minerals tablet Take 1 tablet by mouth once daily.      omega-3 fatty acids/fish oil (FISH OIL-OMEGA-3 FATTY ACIDS) 300-1,000 mg capsule Take by mouth once daily.      pantoprazole (PROTONIX) 40 MG tablet TAKE 1 BY MOUTH ONCE DAILY IN THE MORNING      ubrogepant (UBRELVY)tablet 50 mg TAKE 1 TABLET BY MOUTH AT THE START OF A  MIGRANE. MAY REPEAT ONE TIME IN 2 HOURS IF NEEDED             Discharge Procedure Orders   Diet general     Call MD for:  temperature >100.4     Call MD for:  persistent nausea and vomiting     Call MD for:  difficulty breathing, headache or visual disturbances     Call MD for:  severe uncontrolled pain     Call MD for:  persistent dizziness or light-headedness     Call MD for:  extreme fatigue     Activity as tolerated

## 2025-01-08 NOTE — ANESTHESIA POSTPROCEDURE EVALUATION
Anesthesia Post Evaluation    Patient: Van Michael    Procedure(s) Performed: Procedure(s) (LRB):  EGD, WITH CLOSED BIOPSY (N/A)  COLONOSCOPY, SCREENING, LOW RISK PATIENT (N/A)    Final Anesthesia Type: MAC      Patient location during evaluation: OPS  Patient participation: Yes- Able to Participate  Level of consciousness: awake, oriented and awake and alert  Post-procedure vital signs: reviewed and stable  Pain management: adequate  Airway patency: patent    PONV status at discharge: No PONV  Anesthetic complications: no      Cardiovascular status: blood pressure returned to baseline  Respiratory status: spontaneous ventilation, unassisted and room air  Hydration status: euvolemic  Follow-up not needed.              Vitals Value Taken Time   /77 01/07/25 0942   Temp 36.4 °C (97.6 °F) 01/07/25 0942   Pulse 66 01/07/25 0942   Resp 20 01/07/25 0942   SpO2 98 % 01/07/25 0942         No case tracking events are documented in the log.      Pain/Ivonne Score: Ivonne Score: 10 (1/7/2025  9:42 AM)

## 2025-01-15 LAB — SPECIMEN TO PATHOLOGY - SURGICAL: NORMAL

## 2025-01-24 DIAGNOSIS — R10.10 UPPER ABDOMINAL PAIN, UNSPECIFIED: Primary | ICD-10-CM

## 2025-02-27 ENCOUNTER — HOSPITAL ENCOUNTER (OUTPATIENT)
Dept: RADIOLOGY | Facility: HOSPITAL | Age: 50
Discharge: HOME | End: 2025-02-27
Attending: SURGERY
Payer: COMMERCIAL

## 2025-02-27 DIAGNOSIS — R10.10 UPPER ABDOMINAL PAIN, UNSPECIFIED: ICD-10-CM

## 2025-02-27 PROCEDURE — 63600175 PHARM REV CODE 636 W HCPCS: Performed by: SURGERY

## 2025-02-27 PROCEDURE — 78227 HEPATOBIL SYST IMAGE W/DRUG: CPT | Mod: TC

## 2025-02-27 PROCEDURE — A9537 TC99M MEBROFENIN: HCPCS | Performed by: SURGERY

## 2025-02-27 RX ORDER — SINCALIDE 5 UG/5ML
0.02 INJECTION, POWDER, LYOPHILIZED, FOR SOLUTION INTRAVENOUS ONCE
Status: COMPLETED | OUTPATIENT
Start: 2025-02-27 | End: 2025-02-27

## 2025-02-27 RX ORDER — KIT FOR THE PREPARATION OF TECHNETIUM TC 99M MEBROFENIN 45 MG/10ML
5.4 INJECTION, POWDER, LYOPHILIZED, FOR SOLUTION INTRAVENOUS
Status: COMPLETED | OUTPATIENT
Start: 2025-02-27 | End: 2025-02-27

## 2025-02-27 RX ADMIN — SINCALIDE 1.2 MCG: 5 INJECTION, POWDER, LYOPHILIZED, FOR SOLUTION INTRAVENOUS at 10:02

## 2025-02-27 RX ADMIN — MEBROFENIN 5.4 MILLICURIE: 45 INJECTION, POWDER, LYOPHILIZED, FOR SOLUTION INTRAVENOUS at 09:02

## (undated) DEVICE — UNDERPAD DELUXE FLUFF 30X30IN

## (undated) DEVICE — CONNECTOR WATERJET ENDO

## (undated) DEVICE — BLOCK BITE ADULT 60FR

## (undated) DEVICE — KIT VIA CUSTOM PROCEDURE

## (undated) DEVICE — TUBING IRRIGATION W/ AIR

## (undated) DEVICE — LINER SUCTION CANNISTER REGUGA

## (undated) DEVICE — FORCEP ENDOJAW OVL NDL 2X1550

## (undated) DEVICE — GOWN NONREINF SET-IN SLV XL

## (undated) DEVICE — CANNULA SSOFT C02 MALE 14FT

## (undated) DEVICE — ELECTRODE MEDI-TRACE 855 FOAM

## (undated) DEVICE — KIT BIOGUARD AIR WTR SUC VALVE

## (undated) DEVICE — SPONGE DRY VIA GREEN

## (undated) DEVICE — SOL IRRI STRL WATER 1000ML